# Patient Record
Sex: MALE | Race: WHITE | NOT HISPANIC OR LATINO
[De-identification: names, ages, dates, MRNs, and addresses within clinical notes are randomized per-mention and may not be internally consistent; named-entity substitution may affect disease eponyms.]

---

## 2022-08-01 ENCOUNTER — APPOINTMENT (OUTPATIENT)
Dept: ORTHOPEDIC SURGERY | Facility: CLINIC | Age: 57
End: 2022-08-01

## 2022-08-01 VITALS — HEIGHT: 69 IN | BODY MASS INDEX: 28.88 KG/M2 | WEIGHT: 195 LBS

## 2022-08-01 DIAGNOSIS — M65.9 SYNOVITIS AND TENOSYNOVITIS, UNSPECIFIED: ICD-10-CM

## 2022-08-01 DIAGNOSIS — M79.645 PAIN IN LEFT FINGER(S): ICD-10-CM

## 2022-08-01 PROCEDURE — 73140 X-RAY EXAM OF FINGER(S): CPT | Mod: LT

## 2022-08-01 PROCEDURE — 99213 OFFICE O/P EST LOW 20 MIN: CPT

## 2022-08-01 NOTE — PHYSICAL EXAM
[3rd] : 3rd [PIP Joint] : PIP joint [Middle Phalanx] : middle phalanx [Left] : left fingers [No acute displaced fracture or dislocation] : No acute displaced fracture or dislocation [] : no erythema [de-identified] : mild LEFT middle finger swelling  [de-identified] : decreased excursion of FDP

## 2022-08-01 NOTE — HISTORY OF PRESENT ILLNESS
[5] : 5 [Localized] : localized [Ice] : ice [Retired] : Work status: retired [de-identified] : 56 year old LHD male LEFT middle finger pain and swelling for 2 months.  NO specific trauma.  IT has started to improve recently.  He has experienced this prior and has resolved in past.   [] : no [FreeTextEntry1] : left middle finger  [FreeTextEntry5] : pt states middle finger swells up, pt states he smashed his finger years ago and thinks it maybe due to that  [FreeTextEntry6] : swelling  [de-identified] : overuse

## 2023-09-13 ENCOUNTER — APPOINTMENT (OUTPATIENT)
Dept: ORTHOPEDIC SURGERY | Facility: CLINIC | Age: 58
End: 2023-09-13
Payer: COMMERCIAL

## 2023-09-13 VITALS — BODY MASS INDEX: 27.85 KG/M2 | WEIGHT: 188 LBS | HEIGHT: 69 IN

## 2023-09-13 DIAGNOSIS — Z78.9 OTHER SPECIFIED HEALTH STATUS: ICD-10-CM

## 2023-09-13 PROCEDURE — 99213 OFFICE O/P EST LOW 20 MIN: CPT

## 2023-09-13 PROCEDURE — 72040 X-RAY EXAM NECK SPINE 2-3 VW: CPT

## 2023-10-11 ENCOUNTER — APPOINTMENT (OUTPATIENT)
Dept: ORTHOPEDIC SURGERY | Facility: CLINIC | Age: 58
End: 2023-10-11
Payer: COMMERCIAL

## 2023-10-11 VITALS — HEIGHT: 69 IN | BODY MASS INDEX: 27.85 KG/M2 | WEIGHT: 188 LBS

## 2023-10-11 PROCEDURE — ZZZZZ: CPT

## 2023-11-08 ENCOUNTER — APPOINTMENT (OUTPATIENT)
Dept: ORTHOPEDIC SURGERY | Facility: CLINIC | Age: 58
End: 2023-11-08
Payer: COMMERCIAL

## 2023-11-08 VITALS — HEIGHT: 69 IN | WEIGHT: 188 LBS | BODY MASS INDEX: 27.85 KG/M2

## 2023-11-08 PROCEDURE — 73080 X-RAY EXAM OF ELBOW: CPT | Mod: RT

## 2023-11-08 PROCEDURE — J3490M: CUSTOM

## 2023-11-08 PROCEDURE — 20611 DRAIN/INJ JOINT/BURSA W/US: CPT | Mod: 50

## 2023-11-08 PROCEDURE — 73030 X-RAY EXAM OF SHOULDER: CPT | Mod: 50

## 2023-11-08 PROCEDURE — 99214 OFFICE O/P EST MOD 30 MIN: CPT | Mod: 25

## 2023-12-13 ENCOUNTER — APPOINTMENT (OUTPATIENT)
Dept: ORTHOPEDIC SURGERY | Facility: CLINIC | Age: 58
End: 2023-12-13
Payer: COMMERCIAL

## 2023-12-13 VITALS — HEIGHT: 69 IN | BODY MASS INDEX: 27.85 KG/M2 | WEIGHT: 188 LBS

## 2023-12-13 PROCEDURE — 99213 OFFICE O/P EST LOW 20 MIN: CPT | Mod: 25

## 2023-12-13 PROCEDURE — 20605 DRAIN/INJ JOINT/BURSA W/O US: CPT | Mod: RT

## 2023-12-13 NOTE — HISTORY OF PRESENT ILLNESS
[8] : 8 [4] : 4 [Dull/Aching] : dull/aching [Sharp] : sharp [Stabbing] : stabbing [Throbbing] : throbbing [Tightness] : tightness [Tingling] : tingling [de-identified] : 11/8/23:  chronic bilateral shoulder and elbow pain for years but getting worse.  12/13.23:  shoulders improved.  right elbow swelling with minimal pain. [FreeTextEntry1] : R elbow/ L shoulder  [FreeTextEntry3] : 30 years ago  [FreeTextEntry5] : pt states no injury has occurred.

## 2023-12-13 NOTE — ASSESSMENT
[FreeTextEntry1] : chronic bilateral shoulder and right elbow pain for years but getting worse.  no new injury or trauma.   right shoulder pain with chronic cuff tear.  mild cta present.  improved. left shoulder pain with known cuff tear.  improved. right elbow with history of bursitis.   aseptic bursitis  patient also with history of cervical spine surgery.  known nerve compression stemming from neck.

## 2023-12-13 NOTE — PROCEDURE
[Medium Joint Injection] : medium joint injection [Right] : of the right [Olecranon Bursae] : olecranon bursae [Inflammation] : inflammation [Alcohol] : alcohol [Betadine] : betadine [] : Patient tolerated procedure well [Call if redness, pain or fever occur] : call if redness, pain or fever occur [Apply ice for 15min out of every hour for the next 12-24 hours as tolerated] : apply ice for 15 minutes out of every hour for the next 12-24 hours as tolerated [Patient was advised to rest the joint(s) for ____ days] : patient was advised to rest the joint(s) for [unfilled] days [Previous OTC use and PT nontherapeutic] : patient has tried OTC's including aspirin, Ibuprofen, Aleve, etc or prescription NSAIDS, and/or exercises at home and/or physical therapy without satisfactory response [Patient had decreased mobility in the joint] : patient had decreased mobility in the joint [Risks, benefits, alternatives discussed / Verbal consent obtained] : the risks benefits, and alternatives have been discussed, and verbal consent was obtained [de-identified] : 20 [de-identified] : blood

## 2023-12-13 NOTE — PHYSICAL EXAM
[4 ___] : forward flexion 4[unfilled]/5 [4___] : internal rotation 4[unfilled]/5 [Left] : left shoulder [There are no fractures, subluxations or dislocations. No significant abnormalities are seen] : There are no fractures, subluxations or dislocations. No significant abnormalities are seen [Right] : right elbow [NL (150)] : flexion 150 degrees [NL (0)] : extension 0 degrees [NL (90)] : supination 90 degrees [5___] : internal rotation 5[unfilled]/5 [FreeTextEntry1] : slight elevation of humeral head [TWNoteComboBox7] : active forward flexion 160 degrees [de-identified] : active abduction 130 degrees [TWNoteComboBox6] : internal rotation L4 [de-identified] : external rotation 50 degrees [] : no tenderness over olecranon [FreeTextEntry3] : . no erythema

## 2024-01-03 ENCOUNTER — APPOINTMENT (OUTPATIENT)
Dept: ORTHOPEDIC SURGERY | Facility: CLINIC | Age: 59
End: 2024-01-03
Payer: COMMERCIAL

## 2024-01-03 VITALS — WEIGHT: 188 LBS | HEIGHT: 69 IN | BODY MASS INDEX: 27.85 KG/M2

## 2024-01-03 PROCEDURE — ZZZZZ: CPT

## 2024-01-05 NOTE — HISTORY OF PRESENT ILLNESS
[de-identified] : 01/03/2024 - Patient presenting in follow up with predominate complaint of neck pain. He received CAMILLE with Dr. Ontiveros which resolved the numbness and tingling into the left arm. His chief complaint is continued neck pain, unchanged from CASSANDRA. Orthopedic pillow provides relief of neck pain. Has been under care with Dr. Johnson, received two CSIS into the shoulder and reports transient relief. Taking oxycodone as prescribed by Dr. Ontiveros which provides symptom control of neck pain.   10/11/2023 - Patient presenting for review of MRI. Symptoms remain the same, complaints of pain radiating into the b/l UE L > R.  Followed up with Dr. Ontiveros and he is scheduled for CAMILLE next Friday. Complaints of intermittent headaches, but states it is mildly improved with orthopedic pillow.   09/13/2023 - Patient presenting for a re-evaulation. Complains of chronic neck pain, b/l paresthesias and pain into the BUE L > R. States positional ROM cervical reproduces pain into the left arm. Sees pain management monthly Dr. Ontiveros. He is taking prescribed oxy 5 mg which is helpful. Underwent TPIs with no relief. hx of torn rotator cuffs b/l 28 yrs ago, but never pursed surgery. Compared to 6 months ago, severity is worse. No change to general medical health.  [FreeTextEntry5] : 09/13/2023 - Pt states that he has been in pain since his last visit from 2/7/2022. Pt states that he has horrible mobility. feels grinding with numbness/tingling and pain down the left arm. pain in right arm. overall pain 7-8/10.

## 2024-01-05 NOTE — PHYSICAL EXAM
[Normal Coordination] : normal coordination [Normal DTR UE/LE] : normal DTR UE/LE  [Normal Sensation] : normal sensation [Normal Mood and Affect] : normal mood and affect [Orientated] : orientated [Able to Communicate] : able to communicate [Normal Skin] : normal skin [No Rash] : no rash [No Ulcers] : no ulcers [No Lesions] : no lesions [No obvious lymphadenopathy in areas examined] : no obvious lymphadenopathy in areas examined [Well Developed] : well developed [Peripheral vascular exam is grossly normal] : peripheral vascular exam is grossly normal [No Respiratory Distress] : no respiratory distress [Flexion] : flexion [Extension] : extension [Rotation to left] : rotation to left [] : Pain does not radiate to right arm with motion

## 2024-01-05 NOTE — DISCUSSION/SUMMARY
[de-identified] : 59 y/o M with chronic cervical radiculopathy. 1. MRI reveals stable fusion C3-7. anterolisthesis C7T1 L > R with nerve impingement. Anterolisthesis C2/3 without nerve impingement.  Patient experiencing sustained relief of numbness/tingling in the LUE after CAMILLE with Dr. Ontiveros. He will continue to follow up with pain management for medical management. Discussed possible repeat CAMILLE in future if there is return of LUE symptoms.  Surgical candidate for revision decompression/fusion ( likely C7T1 anterior, possible posterior) if refractory to all non-operative management and if symptoms become functionally incapacitating.  FUV 2/3 months.   Prior to appointment and during encounter with patient extensive medical records were reviewed including but not limited to, hospital records, outpatient records, imaging results, and lab data.During this appointment the patient was examined, diagnoses were discussed and explained in a face to face manner. In addition extensive time was spent reviewing aforementioned diagnostic studies. Counseling including abnormal image results, differential diagnoses, treatment options, risk and benefits, lifestyle changes, current condition, and current medications was performed. Patient's comments, questions, and concerns were addressed and patient verbalized understanding. Based on this patient's presentation at our office, which is an orthopedic spine surgeon's office, this patient inherently / intrinsically has a risk, however minute, of developing issues such as Cauda equina syndrome, bowel and bladder changes, or progression of motor or neurological deficits such as paralysis which may be permanent.  LEANN GEORGE Acting as a Scribe for Leann Beal, attest that this documentation has been prepared under the direction and in the presence of Provider Javan Grande MD.

## 2024-01-05 NOTE — DATA REVIEWED
[Report was reviewed and noted in the chart] : The report was reviewed and noted in the chart [I independently reviewed and interpreted images and report] : I independently reviewed and interpreted images and report [FreeTextEntry1] : I stop paperwork reviewed Pain mgmt progress notes reviewed Orthopedic progress notes reviewed

## 2024-02-07 ENCOUNTER — APPOINTMENT (OUTPATIENT)
Dept: ORTHOPEDIC SURGERY | Facility: CLINIC | Age: 59
End: 2024-02-07
Payer: COMMERCIAL

## 2024-02-07 DIAGNOSIS — M70.21 OLECRANON BURSITIS, RIGHT ELBOW: ICD-10-CM

## 2024-02-07 DIAGNOSIS — M25.529 PAIN IN UNSPECIFIED ELBOW: ICD-10-CM

## 2024-02-07 DIAGNOSIS — M77.8 OTHER ENTHESOPATHIES, NOT ELSEWHERE CLASSIFIED: ICD-10-CM

## 2024-02-07 PROCEDURE — 99213 OFFICE O/P EST LOW 20 MIN: CPT

## 2024-02-07 NOTE — PHYSICAL EXAM
[4 ___] : forward flexion 4[unfilled]/5 [There are no fractures, subluxations or dislocations. No significant abnormalities are seen] : There are no fractures, subluxations or dislocations. No significant abnormalities are seen [Right] : right elbow [NL (150)] : flexion 150 degrees [FreeTextEntry1] : slight elevation of humeral head [de-identified] : active abduction 130 degrees [TWNoteComboBox6] : internal rotation L4 [de-identified] : external rotation 50 degrees [Left] : left elbow [NL (0)] : extension 0 degrees [NL (90)] : supination 90 degrees [4___] : extension 4[unfilled]/5 [5___] : supination 5[unfilled]/5 [] : motor exam 5/5  [FreeTextEntry3] : . no erythema [TWNoteComboBox7] : flexion 130 degrees

## 2024-02-07 NOTE — HISTORY OF PRESENT ILLNESS
[Dull/Aching] : dull/aching [Sharp] : sharp [Stabbing] : stabbing [Tightness] : tightness [Nothing helps with pain getting better] : Nothing helps with pain getting better [de-identified] : 11/8/23:  chronic bilateral shoulder and elbow pain for years but getting worse.  12/13.23:  shoulders improved.  right elbow swelling with minimal pain. 2/7/24: shoulders and elbow pain persists. [8] : 8 [4] : 4 [Throbbing] : throbbing [Tingling] : tingling [FreeTextEntry1] : R elbow/ L shoulder  [FreeTextEntry3] : 30 years ago  [FreeTextEntry5] : pt states no injury has occurred.  [de-identified] : Activity  [de-identified] : At home exercises.

## 2024-02-07 NOTE — ASSESSMENT
[FreeTextEntry1] : chronic bilateral shoulder and right elbow pain for years but getting worse.  no new injury or trauma.   right shoulder pain with chronic cuff tear.  mild cta present.  improved. left shoulder pain with known cuff tear.  improved. right elbow pain with mostly likely medial and lateral epicondylitis left elbow pain with likely medial and lateral epicondylitis.  patient also with history of cervical spine surgery.  known nerve compression stemming from neck.

## 2024-02-28 ENCOUNTER — APPOINTMENT (OUTPATIENT)
Dept: ORTHOPEDIC SURGERY | Facility: CLINIC | Age: 59
End: 2024-02-28
Payer: COMMERCIAL

## 2024-02-28 DIAGNOSIS — M75.102 UNSPECIFIED ROTATOR CUFF TEAR OR RUPTURE OF LEFT SHOULDER, NOT SPECIFIED AS TRAUMATIC: ICD-10-CM

## 2024-02-28 DIAGNOSIS — M75.101 UNSPECIFIED ROTATOR CUFF TEAR OR RUPTURE OF RIGHT SHOULDER, NOT SPECIFIED AS TRAUMATIC: ICD-10-CM

## 2024-02-28 DIAGNOSIS — M25.511 PAIN IN RIGHT SHOULDER: ICD-10-CM

## 2024-02-28 DIAGNOSIS — M25.512 PAIN IN RIGHT SHOULDER: ICD-10-CM

## 2024-02-28 PROCEDURE — 99214 OFFICE O/P EST MOD 30 MIN: CPT

## 2024-02-28 NOTE — PHYSICAL EXAM
[4 ___] : forward flexion 4[unfilled]/5 [FreeTextEntry1] : slight elevation of humeral head [de-identified] : active abduction 130 degrees [TWNoteComboBox6] : internal rotation L4 [de-identified] : external rotation 50 degrees [Right] : right elbow [NL (150)] : flexion 150 degrees [Left] : left elbow [NL (0)] : extension 0 degrees [NL (90)] : supination 90 degrees [4___] : extension 4[unfilled]/5 [5___] : supination 5[unfilled]/5 [] : motor exam 5/5  [FreeTextEntry3] : . no erythema [TWNoteComboBox7] : flexion 130 degrees

## 2024-02-28 NOTE — DISCUSSION/SUMMARY
[de-identified] : will plan on left shoulder scope, possible eua, rico, capsular release, possible biceps tenodesis, bursectomy, sad.  possible ac joint. rba discussed.

## 2024-02-28 NOTE — ASSESSMENT
[FreeTextEntry1] : chronic bilateral shoulder and right elbow pain for years but getting worse.  no new injury or trauma.   right shoulder pain. mri 2024 shows slap tear, capsultis, ac joint oa, impingement. left shoulder pain. mri 2024 shows slap tear, capsultis, ac joint oa, impingement. discussed biceps tenotomy.  patient has some concerns about deformity.  right elbow pain with mostly likely medial and lateral epicondylitis left elbow pain with likely medial and lateral epicondylitis.  patient also with history of cervical spine surgery.  known nerve compression stemming from neck.

## 2024-02-28 NOTE — HISTORY OF PRESENT ILLNESS
[8] : 8 [4] : 4 [Dull/Aching] : dull/aching [Sharp] : sharp [Stabbing] : stabbing [Tightness] : tightness [Throbbing] : throbbing [Nothing helps with pain getting better] : Nothing helps with pain getting better [Tingling] : tingling [] : yes [de-identified] : 11/8/23:  chronic bilateral shoulder and elbow pain for years but getting worse.  12/13.23:  shoulders improved.  right elbow swelling with minimal pain. 2/7/24: shoulders and elbow pain persists. [FreeTextEntry1] : R elbow/ L shoulder  [FreeTextEntry3] : 30 years ago  [FreeTextEntry5] : pt states no injury has occurred.  [de-identified] : Activity  [de-identified] : Standup MRI  [de-identified] : At home exercises.

## 2024-03-20 ENCOUNTER — APPOINTMENT (OUTPATIENT)
Dept: ORTHOPEDIC SURGERY | Facility: CLINIC | Age: 59
End: 2024-03-20
Payer: COMMERCIAL

## 2024-03-20 VITALS — HEIGHT: 69 IN | BODY MASS INDEX: 27.85 KG/M2 | WEIGHT: 188 LBS

## 2024-03-20 DIAGNOSIS — M48.02 SPINAL STENOSIS, CERVICAL REGION: ICD-10-CM

## 2024-03-20 PROCEDURE — ZZZZZ: CPT

## 2024-03-21 PROBLEM — M48.02 FORAMINAL STENOSIS OF CERVICAL REGION: Status: ACTIVE | Noted: 2024-03-21

## 2024-03-21 NOTE — DISCUSSION/SUMMARY
[de-identified] : 57 y/o M with chronic cervical radiculopathy.  MRI reveals stable fusion C3-7. anterolisthesis C7T1 L > R with with severe foraminal stenosis and C8 nerve impingement. Anterolisthesis C2/3 without nerve impingement.  Patient is still assessing response to recent CAMILLE administered 1.5 week ago, although there has been no real relief. Discussed that since his arm pain remains functionally incapacitating despite extensive conservative care ( activity modification, medical mgmt, exercise based rehabilitation and interventional spine injections) , he is indicated for ACDF PSF C7T1. Given grade 1-2 anterolisthesis C7 on T1 below a 4 level anterior fusion I am recommending a same day 2 stage procedure to include ACDF C7T1 and lami/PSF C7T1. I do not believe that an anterior cervical plate alone will be sufficient instrumentation to support the spine following the anterior decompression. Risks, benefits and expected outcomes have been explained to the patient. Patient was understanding and in agreement. Patient elects to proceed. He will follow up with surgical win.   Prior to appointment and during encounter with patient extensive medical records were reviewed including but not limited to, hospital records, outpatient records, imaging results, and lab data.During this appointment the patient was examined, diagnoses were discussed and explained in a face to face manner. In addition extensive time was spent reviewing aforementioned diagnostic studies. Counseling including abnormal image results, differential diagnoses, treatment options, risk and benefits, lifestyle changes, current condition, and current medications was performed. Patient's comments, questions, and concerns were addressed and patient verbalized understanding. Based on this patient's presentation at our office, which is an orthopedic spine surgeon's office, this patient inherently / intrinsically has a risk, however minute, of developing issues such as Cauda equina syndrome, bowel and bladder changes, or progression of motor or neurological deficits such as paralysis which may be permanent.  LEANN WILSON Acting as a Scribe for Dr. Harjinder FLORES, Leann Wilson, attest that this documentation has been prepared under the direction and in the presence of Provider Javan Grande MD.

## 2024-03-21 NOTE — HISTORY OF PRESENT ILLNESS
[de-identified] : 03/20/2024 - Patient presenting in follow up c/o B/l radic arm pain, states Left is majority of pain (50%). Intermittent numbness/tingling in his hands. He reports 1st CAMILLE provided one month of relief in his arm pain. Second inj with Dr. Ontiveros approx 1.5 wk ago has not providing any real relief. BUE arm pain is reproduced with cervical rom. Considering cervical surgery. His upper extremity strength remains intact.   01/03/2024 - Patient presenting in follow up with predominate complaint of neck pain. He received CAMILLE with Dr. Ontiveros which resolved the numbness and tingling into the left arm. His chief complaint is continued neck pain, unchanged from CASSANDRA. Orthopedic pillow provides relief of neck pain. Has been under care with Dr. Johnson, received two CSIS into the shoulder and reports transient relief. Taking oxycodone as prescribed by Dr. Ontiveros which provides symptom control of neck pain.   10/11/2023 - Patient presenting for review of MRI. Symptoms remain the same, complaints of pain radiating into the b/l UE L > R.  Followed up with Dr. Ontiveros and he is scheduled for CAMILLE next Friday. Complaints of intermittent headaches, but states it is mildly improved with orthopedic pillow.   09/13/2023 - Patient presenting for a re-evaulation. Complains of chronic neck pain, b/l paresthesias and pain into the BUE L > R. States positional ROM cervical reproduces pain into the left arm. Sees pain management monthly Dr. Ontiveros. He is taking prescribed oxy 5 mg which is helpful. Underwent TPIs with no relief. hx of torn rotator cuffs b/l 28 yrs ago, but never pursed surgery. Compared to 6 months ago, severity is worse. No change to general medical health.  [FreeTextEntry5] : 09/13/2023 - Pt states that he has been in pain since his last visit from 2/7/2022. Pt states that he has horrible mobility. feels grinding with numbness/tingling and pain down the left arm. pain in right arm. overall pain 7-8/10.

## 2024-03-21 NOTE — DATA REVIEWED
[I independently reviewed and interpreted images and report] : I independently reviewed and interpreted images and report [Report was reviewed and noted in the chart] : The report was reviewed and noted in the chart [FreeTextEntry1] : I stop paperwork reviewed Pain mgmt progress notes reviewed Orthopedic progress notes reviewed

## 2024-03-21 NOTE — PHYSICAL EXAM
[Normal DTR UE/LE] : normal DTR UE/LE  [Normal Coordination] : normal coordination [Normal Sensation] : normal sensation [Normal Mood and Affect] : normal mood and affect [Oriented] : oriented [Able to Communicate] : able to communicate [Normal Skin] : normal skin [No Rash] : no rash [No Ulcers] : no ulcers [No Lesions] : no lesions [No obvious lymphadenopathy in areas examined] : no obvious lymphadenopathy in areas examined [Well Developed] : well developed [Peripheral vascular exam is grossly normal] : peripheral vascular exam is grossly normal [No Respiratory Distress] : no respiratory distress [Flexion] : flexion [Extension] : extension [Rotation to left] : rotation to left [] : Pain does not radiate to right arm with motion

## 2024-05-16 ENCOUNTER — APPOINTMENT (OUTPATIENT)
Age: 59
End: 2024-05-16

## 2024-05-29 ENCOUNTER — APPOINTMENT (OUTPATIENT)
Dept: ORTHOPEDIC SURGERY | Facility: CLINIC | Age: 59
End: 2024-05-29

## 2024-06-18 ENCOUNTER — APPOINTMENT (OUTPATIENT)
Dept: ORTHOPEDIC SURGERY | Facility: HOSPITAL | Age: 59
End: 2024-06-18
Payer: COMMERCIAL

## 2024-06-18 PROCEDURE — 22853 INSJ BIOMECHANICAL DEVICE: CPT | Mod: 80

## 2024-06-18 PROCEDURE — 22600 ARTHRD PST TQ 1NTRSPC CRV: CPT | Mod: 62,58

## 2024-06-18 PROCEDURE — 63020 LAMOT DCMPRN NRV RT 1 CERV: CPT | Mod: 62,58

## 2024-06-18 PROCEDURE — 22551 ARTHRD ANT NTRBDY CERVICAL: CPT | Mod: 62

## 2024-06-18 PROCEDURE — 22840 INSERT SPINE FIXATION DEVICE: CPT | Mod: 58

## 2024-06-18 PROCEDURE — 22850 REMOVE SPINE FIXATION DEVICE: CPT

## 2024-06-18 PROCEDURE — 22850 REMOVE SPINE FIXATION DEVICE: CPT | Mod: 80,59

## 2024-06-18 PROCEDURE — 22614 ARTHRD PST TQ 1NTRSPC EA ADD: CPT | Mod: 62,58

## 2024-06-18 PROCEDURE — 22853 INSJ BIOMECHANICAL DEVICE: CPT

## 2024-06-18 PROCEDURE — 22840 INSERT SPINE FIXATION DEVICE: CPT | Mod: 80,58

## 2024-06-25 RX ORDER — METHYLPREDNISOLONE 4 MG/1
4 TABLET ORAL
Qty: 1 | Refills: 0 | Status: ACTIVE | COMMUNITY
Start: 2024-06-25 | End: 1900-01-01

## 2024-06-26 ENCOUNTER — APPOINTMENT (OUTPATIENT)
Dept: ORTHOPEDIC SURGERY | Facility: CLINIC | Age: 59
End: 2024-06-26
Payer: COMMERCIAL

## 2024-06-26 VITALS — BODY MASS INDEX: 27.85 KG/M2 | HEIGHT: 69 IN | WEIGHT: 188 LBS

## 2024-06-26 DIAGNOSIS — M54.12 RADICULOPATHY, CERVICAL REGION: ICD-10-CM

## 2024-06-26 PROCEDURE — 99024 POSTOP FOLLOW-UP VISIT: CPT

## 2024-06-26 PROCEDURE — 72040 X-RAY EXAM NECK SPINE 2-3 VW: CPT

## 2024-06-26 RX ORDER — METHOCARBAMOL 750 MG/1
750 TABLET, FILM COATED ORAL 4 TIMES DAILY
Qty: 120 | Refills: 1 | Status: ACTIVE | COMMUNITY
Start: 2024-06-26 | End: 1900-01-01

## 2024-07-05 ENCOUNTER — APPOINTMENT (OUTPATIENT)
Dept: ORTHOPEDIC SURGERY | Facility: CLINIC | Age: 59
End: 2024-07-05
Payer: COMMERCIAL

## 2024-07-05 VITALS — BODY MASS INDEX: 27.99 KG/M2 | HEIGHT: 69 IN | WEIGHT: 189 LBS

## 2024-07-05 DIAGNOSIS — M54.12 RADICULOPATHY, CERVICAL REGION: ICD-10-CM

## 2024-07-05 PROCEDURE — 99024 POSTOP FOLLOW-UP VISIT: CPT

## 2024-07-05 RX ORDER — OXYCODONE HYDROCHLORIDE 15 MG/1
15 TABLET ORAL 4 TIMES DAILY
Qty: 60 | Refills: 0 | Status: ACTIVE | COMMUNITY
Start: 2024-07-05 | End: 1900-01-01

## 2024-07-10 RX ORDER — PREGABALIN 75 MG/1
75 CAPSULE ORAL TWICE DAILY
Qty: 60 | Refills: 1 | Status: ACTIVE | COMMUNITY
Start: 2024-07-10 | End: 1900-01-01

## 2024-07-24 ENCOUNTER — APPOINTMENT (OUTPATIENT)
Dept: ORTHOPEDIC SURGERY | Facility: CLINIC | Age: 59
End: 2024-07-24
Payer: COMMERCIAL

## 2024-07-24 VITALS — HEIGHT: 69 IN | WEIGHT: 189 LBS | BODY MASS INDEX: 27.99 KG/M2

## 2024-07-24 DIAGNOSIS — M54.12 RADICULOPATHY, CERVICAL REGION: ICD-10-CM

## 2024-07-24 PROCEDURE — 99024 POSTOP FOLLOW-UP VISIT: CPT

## 2024-07-24 PROCEDURE — 72040 X-RAY EXAM NECK SPINE 2-3 VW: CPT

## 2024-07-24 RX ORDER — OXYCODONE HYDROCHLORIDE 15 MG/1
15 TABLET ORAL EVERY 6 HOURS
Qty: 4 | Refills: 0 | Status: ACTIVE | COMMUNITY
Start: 2024-07-24 | End: 1900-01-01

## 2024-07-27 NOTE — DATA REVIEWED
[MRI] : MRI [Cervical Spine] : cervical spine [I independently reviewed and interpreted images and report] : I independently reviewed and interpreted images and report [I reviewed the films/CD] : I reviewed the films/CD [FreeTextEntry2] : MRI taken of the cervical spine on 06/27/24 at [facility] reveals post op change anterior and posterior decompression C7-T1 with no apparent residual nerve impingement. [FreeTextEntry1] : I stop paperwork reviewed Pain mgmt progress notes reviewed Orthopedic progress notes reviewed

## 2024-07-27 NOTE — DATA REVIEWED
[MRI] : MRI [Cervical Spine] : cervical spine [I independently reviewed and interpreted images and report] : I independently reviewed and interpreted images and report [I reviewed the films/CD] : I reviewed the films/CD [FreeTextEntry2] : MRI taken of the cervical spine on 06/27/24 at [facility] reveals post op change anterior and posterior decompression C7-T1 with no apparent residual nerve impingement. [FreeTextEntry1] : I stop paperwork reviewed Pain mgmt progress notes reviewed Orthopedic progress notes reviewed

## 2024-07-27 NOTE — DISCUSSION/SUMMARY
[Medication Risks Reviewed] : Medication risks reviewed [de-identified] : 59 y/o M s/p ACDF C7-T1 STAGE 2: C7-T1 CERVICAL LAMINECTOMY FUSION AND INSTRUMENTATION (June 18th, 2024) Explained expected outcomes post op including reduction in pain, improvement in function and expected recovery timeframe. All questions were answered to their satisfaction. Oxycodone 15 mg renewed.  Strength and numbness improving compared to last week. Continue with outpatient occupational therapy. Counseled patient that weakness may persist for several months but should recover. No indication for further surgical intervention at this time.  Follow up with Dr. Ontiveros and in 6 weeks.   Prior to appointment and during encounter with patient extensive medical records were reviewed including but not limited to, hospital records, outpatient records, imaging results, and lab data. During this appointment the patient was examined, diagnoses were discussed and explained in a face-to-face manner. In addition, extensive time was spent reviewing aforementioned diagnostic studies. Counseling including abnormal image results, differential diagnoses, treatment options, risk and benefits, lifestyle changes, current condition, and current medications was performed. Patient's comments, questions, and concerns were addressed and patient verbalized understanding. Based on this patient's presentation at our office, which is an orthopedic spine surgeon's office, this patient inherently / intrinsically has a risk, however minute, of developing issues such as Cauda equina syndrome, bowel and bladder changes, or progression of motor or neurological deficits such as paralysis which may be permanent.  JULIA KC acting as a Scribe for Julia Beal, attest that this documentation has been prepared under the direction and in the presence of Provider Javan Grande MD.

## 2024-07-27 NOTE — PHYSICAL EXAM
[Normal Coordination] : normal coordination [Normal DTR UE/LE] : normal DTR UE/LE  [Normal Sensation] : normal sensation [Normal Mood and Affect] : normal mood and affect [Oriented] : oriented [Able to Communicate] : able to communicate [Normal Skin] : normal skin [No Rash] : no rash [No Ulcers] : no ulcers [No Lesions] : no lesions [No obvious lymphadenopathy in areas examined] : no obvious lymphadenopathy in areas examined [Well Developed] : well developed [Peripheral vascular exam is grossly normal] : peripheral vascular exam is grossly normal [No Respiratory Distress] : no respiratory distress [Flexion] : flexion [Extension] : extension [3___] : left grasp 3[unfilled]/5 [5___] : right grasp 5[unfilled]/5 [Left Ulnar Forearm] : left ulnar forearm [] : negative Spurling [de-identified] : finger flexion weakness 3/5. Left hand strength improving compared to last week. [de-identified] : Diminshed sensation left forearm

## 2024-07-27 NOTE — HISTORY OF PRESENT ILLNESS
[de-identified] : 07/24/2024: Patient presents s/p ACDF C7-T1 STAGE 2: C7-T1 CERVICAL LAMINECTOMY FUSION. Patient reports he is still in a lot of pain. Patient reports he cannot sleep at night secondary to pain. He states when he turns his head left it goes down to his arm and into his shoulder and he states when he turns to the right his pain radiates into his shoulder blade but his numbness in his hand has gone away. Patient states muscle relaxers cause him hallucinations. Patient reports his  strength has improvement significantly.   07/05/2024: The patient is s/p ACDF C7-T1 STAGE 2: C7-T1 CERVICAL LAMINECTOMY FUSION. He reports that he still is lacking about 50% function in his left hand, but has been making progress in regaining function. Currently in physical therapy. Feels pain in right shoulder blade radiating down right arm. Patient underwent MRI of the cervical spine with and without contrast. Reports that his walking endurance is limited.  06/26/2024 - The patient is s/p ACDF C7-T1 STAGE 2: C7-T1 CERVICAL LAMINECTOMY FUSION. The patient presents with severe left-sided trapezius pain. He is using a Medrol pack and reports that one hour after taking it, the severe pain switched to the right scapula. The oral steroid helped alleviate the left-sided trapezius pain, but it then switched to the right side. The patient also reports left hand weakness affecting  strength and finger strength, which started toward the end of the week in the hospital between 6/20-6/21. He has diminished sensation in the left forearm and states there is severe numbness. The pre-operative pain in his left arm has resolved. Taking oxycodone, Tylenol, muscle relaxer. Wearing cervical collar.   Date of Surgery: 06/18/2024 Pain:  At Rest: 8/10  With Activity:  8/10   03/20/2024 - Patient presenting in follow up c/o B/l radic arm pain, states Left is majority of pain (50%). Intermittent numbness/tingling in his hands. He reports 1st CAMILLE provided one month of relief in his arm pain. Second inj with Dr. Ontiveros approx 1.5 wk ago has not providing any real relief. BUE arm pain is reproduced with cervical rom. Considering cervical surgery. His upper extremity strength remains intact.   01/03/2024 - Patient presenting in follow up with predominate complaint of neck pain. He received CAMILLE with Dr. Ontiveros which resolved the numbness and tingling into the left arm. His chief complaint is continued neck pain, unchanged from CASSANDRA. Orthopedic pillow provides relief of neck pain. Has been under care with Dr. Johnson, received two CSIS into the shoulder and reports transient relief. Taking oxycodone as prescribed by Dr. Ontiveros which provides symptom control of neck pain.   10/11/2023 - Patient presenting for review of MRI. Symptoms remain the same, complaints of pain radiating into the b/l UE L > R.  Followed up with Dr. Ontiveros and he is scheduled for CAMILLE next Friday. Complaints of intermittent headaches, but states it is mildly improved with orthopedic pillow.   09/13/2023 - Patient presenting for a re-evaulation. Complains of chronic neck pain, b/l paresthesias and pain into the BUE L > R. States positional ROM cervical reproduces pain into the left arm. Sees pain management monthly Dr. Ontiveros. He is taking prescribed oxy 5 mg which is helpful. Underwent TPIs with no relief. hx of torn rotator cuffs b/l 28 yrs ago, but never pursed surgery. Compared to 6 months ago, severity is worse. No change to general medical health.  [FreeTextEntry5] : 09/13/2023 - Pt states that he has been in pain since his last visit from 2/7/2022. Pt states that he has horrible mobility. feels grinding with numbness/tingling and pain down the left arm. pain in right arm. overall pain 7-8/10.

## 2024-07-27 NOTE — PHYSICAL EXAM
[Normal Coordination] : normal coordination [Normal DTR UE/LE] : normal DTR UE/LE  [Normal Sensation] : normal sensation [Normal Mood and Affect] : normal mood and affect [Oriented] : oriented [Able to Communicate] : able to communicate [Normal Skin] : normal skin [No Rash] : no rash [No Ulcers] : no ulcers [No Lesions] : no lesions [No obvious lymphadenopathy in areas examined] : no obvious lymphadenopathy in areas examined [Well Developed] : well developed [Peripheral vascular exam is grossly normal] : peripheral vascular exam is grossly normal [No Respiratory Distress] : no respiratory distress [Flexion] : flexion [Extension] : extension [3___] : left grasp 3[unfilled]/5 [5___] : right grasp 5[unfilled]/5 [Left Ulnar Forearm] : left ulnar forearm [] : negative Spurling [de-identified] : finger flexion weakness 3/5. Left hand strength improving compared to last week. [de-identified] : Diminshed sensation left forearm

## 2024-07-27 NOTE — DISCUSSION/SUMMARY
[Medication Risks Reviewed] : Medication risks reviewed [de-identified] : 59 y/o M s/p ACDF C7-T1 STAGE 2: C7-T1 CERVICAL LAMINECTOMY FUSION AND INSTRUMENTATION (June 18th, 2024) Explained expected outcomes post op including reduction in pain, improvement in function and expected recovery timeframe. All questions were answered to their satisfaction. Oxycodone 15 mg renewed.  Strength and numbness improving compared to last week. Continue with outpatient occupational therapy. Counseled patient that weakness may persist for several months but should recover. No indication for further surgical intervention at this time.  Follow up with Dr. Ontiveros and in 6 weeks.   Prior to appointment and during encounter with patient extensive medical records were reviewed including but not limited to, hospital records, outpatient records, imaging results, and lab data. During this appointment the patient was examined, diagnoses were discussed and explained in a face-to-face manner. In addition, extensive time was spent reviewing aforementioned diagnostic studies. Counseling including abnormal image results, differential diagnoses, treatment options, risk and benefits, lifestyle changes, current condition, and current medications was performed. Patient's comments, questions, and concerns were addressed and patient verbalized understanding. Based on this patient's presentation at our office, which is an orthopedic spine surgeon's office, this patient inherently / intrinsically has a risk, however minute, of developing issues such as Cauda equina syndrome, bowel and bladder changes, or progression of motor or neurological deficits such as paralysis which may be permanent.  JULIA KC acting as a Scribe for Julia Beal, attest that this documentation has been prepared under the direction and in the presence of Provider Javan Grande MD.

## 2024-07-27 NOTE — HISTORY OF PRESENT ILLNESS
[de-identified] : 07/24/2024: Patient presents s/p ACDF C7-T1 STAGE 2: C7-T1 CERVICAL LAMINECTOMY FUSION. Patient reports he is still in a lot of pain. Patient reports he cannot sleep at night secondary to pain. He states when he turns his head left it goes down to his arm and into his shoulder and he states when he turns to the right his pain radiates into his shoulder blade but his numbness in his hand has gone away. Patient states muscle relaxers cause him hallucinations. Patient reports his  strength has improvement significantly.   07/05/2024: The patient is s/p ACDF C7-T1 STAGE 2: C7-T1 CERVICAL LAMINECTOMY FUSION. He reports that he still is lacking about 50% function in his left hand, but has been making progress in regaining function. Currently in physical therapy. Feels pain in right shoulder blade radiating down right arm. Patient underwent MRI of the cervical spine with and without contrast. Reports that his walking endurance is limited.  06/26/2024 - The patient is s/p ACDF C7-T1 STAGE 2: C7-T1 CERVICAL LAMINECTOMY FUSION. The patient presents with severe left-sided trapezius pain. He is using a Medrol pack and reports that one hour after taking it, the severe pain switched to the right scapula. The oral steroid helped alleviate the left-sided trapezius pain, but it then switched to the right side. The patient also reports left hand weakness affecting  strength and finger strength, which started toward the end of the week in the hospital between 6/20-6/21. He has diminished sensation in the left forearm and states there is severe numbness. The pre-operative pain in his left arm has resolved. Taking oxycodone, Tylenol, muscle relaxer. Wearing cervical collar.   Date of Surgery: 06/18/2024 Pain:  At Rest: 8/10  With Activity:  8/10   03/20/2024 - Patient presenting in follow up c/o B/l radic arm pain, states Left is majority of pain (50%). Intermittent numbness/tingling in his hands. He reports 1st CAMILLE provided one month of relief in his arm pain. Second inj with Dr. Ontiveros approx 1.5 wk ago has not providing any real relief. BUE arm pain is reproduced with cervical rom. Considering cervical surgery. His upper extremity strength remains intact.   01/03/2024 - Patient presenting in follow up with predominate complaint of neck pain. He received CAMILLE with Dr. Ontiveros which resolved the numbness and tingling into the left arm. His chief complaint is continued neck pain, unchanged from CASSANDRA. Orthopedic pillow provides relief of neck pain. Has been under care with Dr. Johnson, received two CSIS into the shoulder and reports transient relief. Taking oxycodone as prescribed by Dr. Ontiveros which provides symptom control of neck pain.   10/11/2023 - Patient presenting for review of MRI. Symptoms remain the same, complaints of pain radiating into the b/l UE L > R.  Followed up with Dr. Ontiveros and he is scheduled for CAMILLE next Friday. Complaints of intermittent headaches, but states it is mildly improved with orthopedic pillow.   09/13/2023 - Patient presenting for a re-evaulation. Complains of chronic neck pain, b/l paresthesias and pain into the BUE L > R. States positional ROM cervical reproduces pain into the left arm. Sees pain management monthly Dr. Ontiveros. He is taking prescribed oxy 5 mg which is helpful. Underwent TPIs with no relief. hx of torn rotator cuffs b/l 28 yrs ago, but never pursed surgery. Compared to 6 months ago, severity is worse. No change to general medical health.  [FreeTextEntry5] : 09/13/2023 - Pt states that he has been in pain since his last visit from 2/7/2022. Pt states that he has horrible mobility. feels grinding with numbness/tingling and pain down the left arm. pain in right arm. overall pain 7-8/10.

## 2024-09-11 ENCOUNTER — APPOINTMENT (OUTPATIENT)
Dept: ORTHOPEDIC SURGERY | Facility: CLINIC | Age: 59
End: 2024-09-11
Payer: COMMERCIAL

## 2024-09-11 VITALS — HEIGHT: 69 IN | WEIGHT: 189 LBS | BODY MASS INDEX: 27.99 KG/M2

## 2024-09-11 DIAGNOSIS — M54.12 RADICULOPATHY, CERVICAL REGION: ICD-10-CM

## 2024-09-11 PROCEDURE — 99024 POSTOP FOLLOW-UP VISIT: CPT

## 2024-09-11 PROCEDURE — 72040 X-RAY EXAM NECK SPINE 2-3 VW: CPT

## 2024-09-18 NOTE — HISTORY OF PRESENT ILLNESS
[de-identified] : 09/11/2024 - Patient presents s/p ACDF C7-T1 STAGE 2: C7-T1 CERVICAL LAMINECTOMY FUSION DOS 06/18/2024. He reports his preoperative left upper extremity pain, numbness/tingling is resolved. He continues to express chronic aches and pains in his neck, but it is described as overall tolerable and a 7/10 pain level. Continues medical management per pain management. His left-hand weakness is descried as remotely intact hard to say because he is RHD. No incisional complaints.   07/24/2024: Patient presents s/p ACDF C7-T1 STAGE 2: C7-T1 CERVICAL LAMINECTOMY FUSION. Patient reports he is still in a lot of pain. Patient reports he cannot sleep at night secondary to pain. He states when he turns his head left it goes down to his arm and into his shoulder and he states when he turns to the right his pain radiates into his shoulder blade but his numbness in his hand has gone away. Patient states muscle relaxers cause him hallucinations. Patient reports his  strength has improvement significantly.   07/05/2024: The patient is s/p ACDF C7-T1 STAGE 2: C7-T1 CERVICAL LAMINECTOMY FUSION. He reports that he still is lacking about 50% function in his left hand, but has been making progress in regaining function. Currently in physical therapy. Feels pain in right shoulder blade radiating down right arm. Patient underwent MRI of the cervical spine with and without contrast. Reports that his walking endurance is limited.  06/26/2024 - The patient is s/p ACDF C7-T1 STAGE 2: C7-T1 CERVICAL LAMINECTOMY FUSION. The patient presents with severe left-sided trapezius pain. He is using a Medrol pack and reports that one hour after taking it, the severe pain switched to the right scapula. The oral steroid helped alleviate the left-sided trapezius pain, but it then switched to the right side. The patient also reports left hand weakness affecting  strength and finger strength, which started toward the end of the week in the hospital between 6/20-6/21. He has diminished sensation in the left forearm and states there is severe numbness. The pre-operative pain in his left arm has resolved. Taking oxycodone, Tylenol, muscle relaxer. Wearing cervical collar.   Date of Surgery: 06/18/2024 Pain:  At Rest: 8/10  With Activity:  8/10   03/20/2024 - Patient presenting in follow up c/o B/l radic arm pain, states Left is majority of pain (50%). Intermittent numbness/tingling in his hands. He reports 1st CAMILLE provided one month of relief in his arm pain. Second inj with Dr. Ontiveros approx 1.5 wk ago has not providing any real relief. BUE arm pain is reproduced with cervical rom. Considering cervical surgery. His upper extremity strength remains intact.   01/03/2024 - Patient presenting in follow up with predominate complaint of neck pain. He received CAMILLE with Dr. Ontiveros which resolved the numbness and tingling into the left arm. His chief complaint is continued neck pain, unchanged from CASSANDRA. Orthopedic pillow provides relief of neck pain. Has been under care with Dr. Johnson, received two CSIS into the shoulder and reports transient relief. Taking oxycodone as prescribed by Dr. Ontiveros which provides symptom control of neck pain.   10/11/2023 - Patient presenting for review of MRI. Symptoms remain the same, complaints of pain radiating into the b/l UE L > R.  Followed up with Dr. Ontiveros and he is scheduled for CAMILLE next Friday. Complaints of intermittent headaches, but states it is mildly improved with orthopedic pillow.   09/13/2023 - Patient presenting for a re-evaulation. Complains of chronic neck pain, b/l paresthesias and pain into the BUE L > R. States positional ROM cervical reproduces pain into the left arm. Sees pain management monthly Dr. Ontiveros. He is taking prescribed oxy 5 mg which is helpful. Underwent TPIs with no relief. hx of torn rotator cuffs b/l 28 yrs ago, but never pursed surgery. Compared to 6 months ago, severity is worse. No change to general medical health.  [FreeTextEntry5] : 09/13/2023 - Pt states that he has been in pain since his last visit from 2/7/2022. Pt states that he has horrible mobility. feels grinding with numbness/tingling and pain down the left arm. pain in right arm. overall pain 7-8/10.

## 2024-09-18 NOTE — DATA REVIEWED
[I independently reviewed and interpreted images and report] : I independently reviewed and interpreted images and report [FreeTextEntry1] : I stop paperwork reviewed Pain mgmt progress notes reviewed

## 2024-09-18 NOTE — DISCUSSION/SUMMARY
[Medication Risks Reviewed] : Medication risks reviewed [de-identified] : 58 y/o M s/p ACDF C7-T1 STAGE 2: C7-T1 CERVICAL LAMINECTOMY FUSION AND INSTRUMENTATION (June 18th, 2024) Explained expected outcomes post op including reduction in pain, improvement in function and expected recovery timeframe. All questions were answered to their satisfaction. Continue medical management and routine follow ups with Dr. Hendrix. Continue with home exercises. Follow up in 3 months, will order final XR.   Prior to appointment and during encounter with patient extensive medical records were reviewed including but not limited to, hospital records, outpatient records, imaging results, and lab data. During this appointment the patient was examined, diagnoses were discussed and explained in a face-to-face manner. In addition, extensive time was spent reviewing aforementioned diagnostic studies. Counseling including abnormal image results, differential diagnoses, treatment options, risk and benefits, lifestyle changes, current condition, and current medications was performed. Patient's comments, questions, and concerns were addressed and patient verbalized understanding. Based on this patient's presentation at our office, which is an orthopedic spine surgeon's office, this patient inherently / intrinsically has a risk, however minute, of developing issues such as Cauda equina syndrome, bowel and bladder changes, or progression of motor or neurological deficits such as paralysis which may be permanent.  LEANN WILSON Acting as a Scribe for Dr. Harjinder FLORES, Leann Wilson, attest that this documentation has been prepared under the direction and in the presence of Provider Javan Grande MD.

## 2024-09-18 NOTE — HISTORY OF PRESENT ILLNESS
[de-identified] : 09/11/2024 - Patient presents s/p ACDF C7-T1 STAGE 2: C7-T1 CERVICAL LAMINECTOMY FUSION DOS 06/18/2024. He reports his preoperative left upper extremity pain, numbness/tingling is resolved. He continues to express chronic aches and pains in his neck, but it is described as overall tolerable and a 7/10 pain level. Continues medical management per pain management. His left-hand weakness is descried as remotely intact hard to say because he is RHD. No incisional complaints.   07/24/2024: Patient presents s/p ACDF C7-T1 STAGE 2: C7-T1 CERVICAL LAMINECTOMY FUSION. Patient reports he is still in a lot of pain. Patient reports he cannot sleep at night secondary to pain. He states when he turns his head left it goes down to his arm and into his shoulder and he states when he turns to the right his pain radiates into his shoulder blade but his numbness in his hand has gone away. Patient states muscle relaxers cause him hallucinations. Patient reports his  strength has improvement significantly.   07/05/2024: The patient is s/p ACDF C7-T1 STAGE 2: C7-T1 CERVICAL LAMINECTOMY FUSION. He reports that he still is lacking about 50% function in his left hand, but has been making progress in regaining function. Currently in physical therapy. Feels pain in right shoulder blade radiating down right arm. Patient underwent MRI of the cervical spine with and without contrast. Reports that his walking endurance is limited.  06/26/2024 - The patient is s/p ACDF C7-T1 STAGE 2: C7-T1 CERVICAL LAMINECTOMY FUSION. The patient presents with severe left-sided trapezius pain. He is using a Medrol pack and reports that one hour after taking it, the severe pain switched to the right scapula. The oral steroid helped alleviate the left-sided trapezius pain, but it then switched to the right side. The patient also reports left hand weakness affecting  strength and finger strength, which started toward the end of the week in the hospital between 6/20-6/21. He has diminished sensation in the left forearm and states there is severe numbness. The pre-operative pain in his left arm has resolved. Taking oxycodone, Tylenol, muscle relaxer. Wearing cervical collar.   Date of Surgery: 06/18/2024 Pain:  At Rest: 8/10  With Activity:  8/10   03/20/2024 - Patient presenting in follow up c/o B/l radic arm pain, states Left is majority of pain (50%). Intermittent numbness/tingling in his hands. He reports 1st CAMILLE provided one month of relief in his arm pain. Second inj with Dr. Ontiveros approx 1.5 wk ago has not providing any real relief. BUE arm pain is reproduced with cervical rom. Considering cervical surgery. His upper extremity strength remains intact.   01/03/2024 - Patient presenting in follow up with predominate complaint of neck pain. He received CAMILLE with Dr. Ontiveros which resolved the numbness and tingling into the left arm. His chief complaint is continued neck pain, unchanged from CASSANDRA. Orthopedic pillow provides relief of neck pain. Has been under care with Dr. Johnson, received two CSIS into the shoulder and reports transient relief. Taking oxycodone as prescribed by Dr. Ontiveros which provides symptom control of neck pain.   10/11/2023 - Patient presenting for review of MRI. Symptoms remain the same, complaints of pain radiating into the b/l UE L > R.  Followed up with Dr. Ontiveros and he is scheduled for CAMILLE next Friday. Complaints of intermittent headaches, but states it is mildly improved with orthopedic pillow.   09/13/2023 - Patient presenting for a re-evaulation. Complains of chronic neck pain, b/l paresthesias and pain into the BUE L > R. States positional ROM cervical reproduces pain into the left arm. Sees pain management monthly Dr. Ontiveros. He is taking prescribed oxy 5 mg which is helpful. Underwent TPIs with no relief. hx of torn rotator cuffs b/l 28 yrs ago, but never pursed surgery. Compared to 6 months ago, severity is worse. No change to general medical health.  [FreeTextEntry5] : 09/13/2023 - Pt states that he has been in pain since his last visit from 2/7/2022. Pt states that he has horrible mobility. feels grinding with numbness/tingling and pain down the left arm. pain in right arm. overall pain 7-8/10.

## 2024-09-18 NOTE — DISCUSSION/SUMMARY
[Medication Risks Reviewed] : Medication risks reviewed [de-identified] : 58 y/o M s/p ACDF C7-T1 STAGE 2: C7-T1 CERVICAL LAMINECTOMY FUSION AND INSTRUMENTATION (June 18th, 2024) Explained expected outcomes post op including reduction in pain, improvement in function and expected recovery timeframe. All questions were answered to their satisfaction. Continue medical management and routine follow ups with Dr. Hendrix. Continue with home exercises. Follow up in 3 months, will order final XR.   Prior to appointment and during encounter with patient extensive medical records were reviewed including but not limited to, hospital records, outpatient records, imaging results, and lab data. During this appointment the patient was examined, diagnoses were discussed and explained in a face-to-face manner. In addition, extensive time was spent reviewing aforementioned diagnostic studies. Counseling including abnormal image results, differential diagnoses, treatment options, risk and benefits, lifestyle changes, current condition, and current medications was performed. Patient's comments, questions, and concerns were addressed and patient verbalized understanding. Based on this patient's presentation at our office, which is an orthopedic spine surgeon's office, this patient inherently / intrinsically has a risk, however minute, of developing issues such as Cauda equina syndrome, bowel and bladder changes, or progression of motor or neurological deficits such as paralysis which may be permanent.  LEANN WILSON Acting as a Scribe for Dr. Harjinder FLORES, Leann Wilson, attest that this documentation has been prepared under the direction and in the presence of Provider Javan Grande MD.

## 2024-09-18 NOTE — PHYSICAL EXAM
[Normal Coordination] : normal coordination [Normal DTR UE/LE] : normal DTR UE/LE  [Normal Sensation] : normal sensation [Normal Mood and Affect] : normal mood and affect [Oriented] : oriented [Able to Communicate] : able to communicate [Normal Skin] : normal skin [No Rash] : no rash [No Ulcers] : no ulcers [No Lesions] : no lesions [No obvious lymphadenopathy in areas examined] : no obvious lymphadenopathy in areas examined [Well Developed] : well developed [Peripheral vascular exam is grossly normal] : peripheral vascular exam is grossly normal [No Respiratory Distress] : no respiratory distress [Flexion] : flexion [Extension] : extension [5___] : left grasp 5[unfilled]/5 [] : negative Spurling

## 2024-12-11 ENCOUNTER — APPOINTMENT (OUTPATIENT)
Dept: ORTHOPEDIC SURGERY | Facility: CLINIC | Age: 59
End: 2024-12-11
Payer: COMMERCIAL

## 2024-12-11 DIAGNOSIS — M54.12 RADICULOPATHY, CERVICAL REGION: ICD-10-CM

## 2024-12-11 PROCEDURE — 72040 X-RAY EXAM NECK SPINE 2-3 VW: CPT

## 2024-12-11 PROCEDURE — 99213 OFFICE O/P EST LOW 20 MIN: CPT

## 2025-01-15 ENCOUNTER — APPOINTMENT (OUTPATIENT)
Dept: ORTHOPEDIC SURGERY | Facility: CLINIC | Age: 60
End: 2025-01-15

## 2025-07-18 ENCOUNTER — APPOINTMENT (OUTPATIENT)
Dept: ORTHOPEDIC SURGERY | Facility: CLINIC | Age: 60
End: 2025-07-18
Payer: MEDICARE

## 2025-07-18 VITALS — HEIGHT: 69 IN | WEIGHT: 189 LBS | BODY MASS INDEX: 27.99 KG/M2

## 2025-07-18 PROCEDURE — 99204 OFFICE O/P NEW MOD 45 MIN: CPT

## 2025-07-18 PROCEDURE — 72050 X-RAY EXAM NECK SPINE 4/5VWS: CPT
